# Patient Record
Sex: MALE | Race: WHITE | Employment: FULL TIME | URBAN - METROPOLITAN AREA
[De-identification: names, ages, dates, MRNs, and addresses within clinical notes are randomized per-mention and may not be internally consistent; named-entity substitution may affect disease eponyms.]

---

## 2022-07-12 ENCOUNTER — APPOINTMENT (OUTPATIENT)
Dept: GENERAL RADIOLOGY | Age: 37
End: 2022-07-12
Payer: COMMERCIAL

## 2022-07-12 ENCOUNTER — HOSPITAL ENCOUNTER (EMERGENCY)
Age: 37
Discharge: HOME | End: 2022-07-12
Attending: EMERGENCY MEDICINE
Payer: COMMERCIAL

## 2022-07-12 VITALS
WEIGHT: 230 LBS | BODY MASS INDEX: 31.15 KG/M2 | HEIGHT: 72 IN | OXYGEN SATURATION: 97 % | DIASTOLIC BLOOD PRESSURE: 89 MMHG | RESPIRATION RATE: 16 BRPM | HEART RATE: 85 BPM | SYSTOLIC BLOOD PRESSURE: 155 MMHG | TEMPERATURE: 98.4 F

## 2022-07-12 DIAGNOSIS — R07.9 CHEST PAIN, UNSPECIFIED TYPE: Primary | ICD-10-CM

## 2022-07-12 LAB
ALBUMIN SERPL-MCNC: 4.6 G/DL (ref 3.5–5)
ALBUMIN/GLOB SERPL: 1.5 {RATIO}
ALP SERPL-CCNC: 56 U/L (ref 45–117)
ALT SERPL-CCNC: 18 U/L (ref 13–61)
ANION GAP SERPL CALC-SCNC: 13 MMOL/L (ref 7–16)
AST SERPL-CCNC: 18 U/L (ref 15–37)
BILIRUB SERPL-MCNC: 0.2 MG/DL (ref 0.2–1.1)
BUN SERPL-MCNC: 12 MG/DL (ref 7–18)
CALCIUM SERPL-MCNC: 9.5 MG/DL (ref 8.3–10.4)
CHLORIDE SERPL-SCNC: 106 MMOL/L (ref 98–107)
CO2 SERPL-SCNC: 23 MMOL/L (ref 21–32)
CREAT SERPL-MCNC: 0.68 MG/DL (ref 0.8–1.5)
D DIMER PPP FEU-MCNC: 0.33 UG/ML(FEU)
ERYTHROCYTE [DISTWIDTH] IN BLOOD BY AUTOMATED COUNT: 12.5 % (ref 11.9–14.6)
GLOBULIN SER CALC-MCNC: 3.1 G/DL (ref 2.3–3.5)
GLUCOSE SERPL-MCNC: 89 MG/DL (ref 65–100)
HCT VFR BLD AUTO: 44.3 % (ref 41.1–50.3)
HGB BLD-MCNC: 15.4 G/DL (ref 13.6–17.2)
LIPASE SERPL-CCNC: 58 U/L (ref 13–60)
MAGNESIUM SERPL-MCNC: 1.9 MG/DL (ref 1.2–2.6)
MCH RBC QN AUTO: 29.8 PG (ref 26.1–32.9)
MCHC RBC AUTO-ENTMCNC: 34.8 G/DL (ref 31.4–35)
MCV RBC AUTO: 85.9 FL (ref 79.6–97.8)
NRBC # BLD: 0 K/UL (ref 0–0.2)
PLATELET # BLD AUTO: 268 K/UL (ref 150–450)
PMV BLD AUTO: 9.9 FL (ref 9.4–12.3)
POTASSIUM SERPL-SCNC: 4 MMOL/L (ref 3.5–5.1)
PROT SERPL-MCNC: 7.7 G/DL (ref 6.4–8.2)
RBC # BLD AUTO: 5.16 M/UL (ref 4.23–5.6)
SODIUM SERPL-SCNC: 142 MMOL/L (ref 136–145)
TROPONIN T SERPL HS-MCNC: <6 NG/L (ref 0–22)
WBC # BLD AUTO: 8.9 K/UL (ref 4.3–11.1)

## 2022-07-12 PROCEDURE — 84484 ASSAY OF TROPONIN QUANT: CPT

## 2022-07-12 PROCEDURE — 85027 COMPLETE CBC AUTOMATED: CPT

## 2022-07-12 PROCEDURE — 80053 COMPREHEN METABOLIC PANEL: CPT

## 2022-07-12 PROCEDURE — 6370000000 HC RX 637 (ALT 250 FOR IP)

## 2022-07-12 PROCEDURE — 83735 ASSAY OF MAGNESIUM: CPT

## 2022-07-12 PROCEDURE — 85379 FIBRIN DEGRADATION QUANT: CPT

## 2022-07-12 PROCEDURE — 71045 X-RAY EXAM CHEST 1 VIEW: CPT

## 2022-07-12 PROCEDURE — 83690 ASSAY OF LIPASE: CPT

## 2022-07-12 PROCEDURE — 99285 EMERGENCY DEPT VISIT HI MDM: CPT

## 2022-07-12 RX ORDER — SUCRALFATE 1 G/1
1 TABLET ORAL
Status: COMPLETED | OUTPATIENT
Start: 2022-07-12 | End: 2022-07-12

## 2022-07-12 RX ORDER — QUETIAPINE FUMARATE 200 MG/1
100 TABLET, FILM COATED ORAL 2 TIMES DAILY
COMMUNITY

## 2022-07-12 RX ORDER — SERTRALINE HYDROCHLORIDE 100 MG/1
100 TABLET, FILM COATED ORAL DAILY
COMMUNITY

## 2022-07-12 RX ORDER — ALPRAZOLAM 0.5 MG/1
0.5 TABLET ORAL NIGHTLY PRN
COMMUNITY

## 2022-07-12 RX ORDER — FLUTICASONE PROPIONATE 50 MCG
1 SPRAY, SUSPENSION (ML) NASAL DAILY
COMMUNITY

## 2022-07-12 RX ORDER — CETIRIZINE HYDROCHLORIDE 10 MG/1
10 TABLET ORAL DAILY
COMMUNITY

## 2022-07-12 RX ADMIN — SUCRALFATE 1 G: 1 TABLET ORAL at 15:35

## 2022-07-12 ASSESSMENT — ENCOUNTER SYMPTOMS
TROUBLE SWALLOWING: 0
SHORTNESS OF BREATH: 0
COUGH: 1
BACK PAIN: 0
HEARTBURN: 0
ORTHOPNEA: 0
VOMITING: 0
NAUSEA: 0
ABDOMINAL PAIN: 0

## 2022-07-12 ASSESSMENT — PAIN - FUNCTIONAL ASSESSMENT: PAIN_FUNCTIONAL_ASSESSMENT: 0-10

## 2022-07-12 ASSESSMENT — PAIN SCALES - GENERAL
PAINLEVEL_OUTOF10: 4
PAINLEVEL_OUTOF10: 1

## 2022-07-12 NOTE — ED NOTES
I have reviewed discharge instructions with the patient. The patient verbalized understanding. Patient left ED via Discharge Method: ambulatory to Home with himself. Opportunity for questions and clarification provided. Patient given 0 scripts. To continue your aftercare when you leave the hospital, you may receive an automated call from our care team to check in on how you are doing. This is a free service and part of our promise to provide the best care and service to meet your aftercare needs.  If you have questions, or wish to unsubscribe from this service please call [phone]. Thank you for Choosing our [facility] Emergency Department.       Maya Lara RN  07/12/22 4798

## 2022-07-12 NOTE — ED TRIAGE NOTES
Pt presents to ED c/o intermittent chest pain in the middle of the chest that started 1 week ago. Worse with coughing. Denies fever. Negative for COVID and Flu. PCP wants to r/o PE/DVT. Pt states when he coughs he gets dizzy and he feels SOB intermittently. Masked.

## 2022-07-12 NOTE — ED PROVIDER NOTES
[employer] Emergency Department Provider Note                   PCP:                None Provider               Age: 29 y.o. Sex: male       ICD-10-CM    1. Chest pain, unspecified type  R07.9        DISPOSITION Decision To Discharge 07/12/2022 04:13:56 PM       MDM  Number of Diagnoses or Management Options  Chest pain, unspecified type  Diagnosis management comments: Vital signs reviewed, patient stable, NAD, afebrile, nontoxic in appearance    Patient placed on monitor  CBC, CMP, troponin, lipase, magnesium, EKG ordered out of triage  Added a D-dimer    CBC grossly normal  No concerning findings on CMP  Lipase within normal limits  D-dimer within normal limits  Troponin less than 6.0    No repeat troponin drawn as this pain has been going on for 3 days. X-ray of chest negative for any acute cardiopulmonary abnormality    Based on history, physical exam, lab work, current imaging, I do not feel any further lab work or imaging is warranted at this time in the emergency department    I discussed physical exam findings, laboratory and/or imaging findings, treatment and follow-up with the patient and those who were present. I answered any questions they had. They verbalized that they understood and were in agreement with treatment and disposition. I discussed signs and symptoms that would warrant a prompt return to the emergency department with the patient. I included the signs and symptoms on discharge paperwork. Patient verbalized that they understood. .  Patient states he has a prescription for amoxicillin as well as prednisone that was given to him by [facility] urgent care earlier today. Is urgent care sent him over to the ER for evaluation for PE. Pain could be pleuritic in nature, but due to the persistent nature of the pain and not just worsening on inspiration, cardiac work-up was obtained. Patient discharged home in stable condition.   He is to follow-up with his primary care provider within a week. He has a follow-up called in to have referral line with [facility] cardiology. Amount and/or Complexity of Data Reviewed  Clinical lab tests: ordered and reviewed  Tests in the radiology section of CPT®: ordered and reviewed  Independent visualization of images, tracings, or specimens: yes    Risk of Complications, Morbidity, and/or Mortality  Presenting problems: moderate  Management options: low    Patient Progress  Patient progress: stable       Orders Placed This Encounter   Procedures    XR CHEST PORTABLE    CBC    Comprehensive Metabolic Panel    Troponin T    Lipase    Magnesium    D-Dimer, Quantitative    Cardiac Monitor    Pulse Oximetry    EKG 12 Lead    Saline lock IV        Dori Piedra is a 29 y.o. male who presents to the Emergency Department with chief complaint of    Chief Complaint   Patient presents with    Chest Pain      61-year-old male with stated no past medical history presents to the emergency department today with chief complaint of left side chest pain for the past 3 days. Patient describes the pain as dull, achy, heaviness, and several episodes of diaphoresis. .  Patient states he is also had a cough for the past 3 days and a headache. Patient took a COVID-19 and a flu test today at an [facility] urgent care that were both negative. Patient states there is a family history of unprovoked blood clots. Patient denies any recent travel, recent surgeries, immobility, shortness of breath, nausea, vomiting, diarrhea, headache. The history is provided by the patient. No  was used.    Chest Pain  Pain location:  L chest  Pain quality: aching, dull and pressure    Pain radiates to:  Does not radiate  Pain severity:  Moderate  Onset quality:  Sudden  Duration:  4 days  Timing:  Intermittent  Progression:  Unchanged  Chronicity:  New  Relieved by:  Nothing  Worsened by:  Nothing  Ineffective treatments:  None tried  Associated symptoms: cough and diaphoresis    Associated symptoms: no abdominal pain, no AICD problem, no altered mental status, no anorexia, no anxiety, no back pain, no claudication, no dizziness, no dysphagia, no fatigue, no fever, no headache, no heartburn, no lower extremity edema, no nausea, no near-syncope, no numbness, no orthopnea, no palpitations, no PND, no shortness of breath, no syncope, no vomiting and no weakness    Risk factors: no coronary artery disease, no diabetes mellitus, no immobilization, no prior DVT/PE, no smoking and no surgery        All other systems reviewed and are negative. Review of Systems   Constitutional: Positive for diaphoresis. Negative for fatigue and fever. HENT: Negative for trouble swallowing. Respiratory: Positive for cough. Negative for shortness of breath. Cardiovascular: Positive for chest pain. Negative for palpitations, orthopnea, claudication, syncope, PND and near-syncope. Gastrointestinal: Negative for abdominal pain, anorexia, heartburn, nausea and vomiting. Musculoskeletal: Negative for back pain. Neurological: Negative for dizziness, weakness, numbness and headaches. All other systems reviewed and are negative. Past Medical History:   Diagnosis Date    Anxiety     Depressed     Hypertension     PTSD (post-traumatic stress disorder)         History reviewed. No pertinent surgical history. History reviewed. No pertinent family history. Social Connections:     Frequency of Communication with Friends and Family: Not on file    Frequency of Social Gatherings with Friends and Family: Not on file    Attends Yarsanism Services: Not on file    Active Member of Clubs or Organizations: Not on file    Attends Club or Organization Meetings: Not on file    Marital Status: Not on file        No Known Allergies     Vitals signs and nursing note reviewed.    Patient Vitals for the past 4 hrs:   Temp Pulse Resp BP SpO2   07/12/22 1600 98.4 °F (36.9 °C) 85 16 (!) 155/89 97 %   07/12/22 1503 -- 69 -- (!) 154/83 96 %   07/12/22 1431 98.6 °F (37 °C) 74 16 (!) 154/89 100 %          Physical Exam  Vitals and nursing note reviewed. Constitutional:       General: He is not in acute distress. Appearance: Normal appearance. He is normal weight. He is not ill-appearing, toxic-appearing or diaphoretic. HENT:      Head: Normocephalic and atraumatic. Mouth/Throat:      Mouth: Mucous membranes are moist.      Pharynx: Oropharynx is clear. Eyes:      General: No scleral icterus. Extraocular Movements: Extraocular movements intact. Conjunctiva/sclera: Conjunctivae normal.   Cardiovascular:      Rate and Rhythm: Normal rate. Pulses: Normal pulses. Heart sounds: Normal heart sounds. Comments: 2+ bilateral radial pulses  2+ bilateral DP and PT pulses  Pulmonary:      Effort: Pulmonary effort is normal.      Breath sounds: Normal breath sounds. Abdominal:      General: Bowel sounds are normal.      Palpations: Abdomen is soft. Tenderness: There is no abdominal tenderness. There is no right CVA tenderness, left CVA tenderness, guarding or rebound. Musculoskeletal:         General: Normal range of motion. Cervical back: Normal range of motion and neck supple. Right lower leg: No edema. Left lower leg: No edema. Skin:     General: Skin is warm and dry. Capillary Refill: Capillary refill takes less than 2 seconds. Coloration: Skin is not jaundiced. Neurological:      General: No focal deficit present. Mental Status: He is alert and oriented to person, place, and time. Psychiatric:         Mood and Affect: Mood normal.         Behavior: Behavior normal.         Thought Content:  Thought content normal.         Judgment: Judgment normal.          Procedures    ED EKG Interpretation  EKG was interpreted in the absence of a cardiologist.    Rate: 71  EKG Interpretation: Sinus rhythm, normal axis, no QT prolongation  ST Segments: No significant ST elevations or depressions    Labs Reviewed   COMPREHENSIVE METABOLIC PANEL - Abnormal; Notable for the following components:       Result Value    CREATININE 0.68 (*)     All other components within normal limits   CBC   TROPONIN T   LIPASE   MAGNESIUM   D-DIMER, QUANTITATIVE        XR CHEST PORTABLE   Final Result   Negative for acute change. ED Course as of 07/12/22 1704   Tue Jul 12, 2022   1448 EKG review by ER doctor:Normal sinus rhythmNo acute ischemic ST segment changesNo QTC prolongationRate of: 71 [AC]   1526 CBC:    WBC 8.9   RBC 5.16   Hemoglobin Quant 15.4   Hematocrit 44.3   MCV 85.9   MCH 29.8   MCHC 34.8   RDW 12.5   Platelet Count 815   MPV 9.9   Nucleated Red Blood Cells 0.00 [JG]   1527 XR CHEST PORTABLE  FINDINGS:   Lungs: are clear. Costophrenic angles: are sharp. Heart size: is normal.   Pulmonary vasculature: is unremarkable. Aorta: Unremarkable. Included portion of the upper abdomen: is unremarkable. Bones: No gross bony lesions.     Other: Positioning is lordotic.     IMPRESSION:  Negative for acute change. [JG]   1558 D-Dimer, Quantitative:    D-Dimer, Quant 0.33 [JG]   1558 Lipase:    Lipase 58 [JG]   1558 Troponin T:    Troponin T <6.0 [JG]   1558 Comprehensive Metabolic Panel(!):    Sodium 142   Potassium 4.0   Chloride 106   CO2 23   Anion Gap 13   GLUCOSE, FASTING,GF 89   BUN,BUNPL 12   Creatinine 0.68(!)   GFR  >171   GFR Non- >60   CALCIUM, SERUM, 288514 9.5   Bilirubin 0.2   ALT 18   AST 18   Alk Phosphatase 56   Total Protein 7.7   Albumin 4.6   Globulin 3.1   ALBUMIN/GLOBULIN RATIO 1.5 [JG]      ED Course User Index  [AC] Stefan Cohen MD  [JG] SANDRA Maria        Voice dictation software was used during the making of this note. This software is not perfect and grammatical and other typographical errors may be present.   This note has not been completely proofread for errors.      Jermain MaiPort [place], [place]  07/12/22 2070

## 2022-07-12 NOTE — Clinical Note
Gay Andrade was seen and treated in our emergency department on 7/12/2022. He may return to work on 07/13/2022. If you have any questions or concerns, please don't hesitate to call.       SANDRA Wilks